# Patient Record
Sex: FEMALE | Race: WHITE | Employment: UNEMPLOYED | URBAN - METROPOLITAN AREA
[De-identification: names, ages, dates, MRNs, and addresses within clinical notes are randomized per-mention and may not be internally consistent; named-entity substitution may affect disease eponyms.]

---

## 2018-11-05 ENCOUNTER — APPOINTMENT (OUTPATIENT)
Dept: GENERAL RADIOLOGY | Age: 33
End: 2018-11-05
Payer: MEDICARE

## 2018-11-05 ENCOUNTER — HOSPITAL ENCOUNTER (EMERGENCY)
Age: 33
Discharge: HOME | End: 2018-11-06
Attending: EMERGENCY MEDICINE
Payer: MEDICARE

## 2018-11-05 VITALS
DIASTOLIC BLOOD PRESSURE: 81 MMHG | SYSTOLIC BLOOD PRESSURE: 125 MMHG | BODY MASS INDEX: 44.12 KG/M2 | HEART RATE: 101 BPM | TEMPERATURE: 99 F | WEIGHT: 249 LBS | OXYGEN SATURATION: 98 % | HEIGHT: 63 IN | RESPIRATION RATE: 24 BRPM

## 2018-11-05 DIAGNOSIS — S93.492A SPRAIN OF ANTERIOR TALOFIBULAR LIGAMENT OF LEFT ANKLE, INITIAL ENCOUNTER: Primary | ICD-10-CM

## 2018-11-05 PROCEDURE — 73610 X-RAY EXAM OF ANKLE: CPT

## 2018-11-05 PROCEDURE — 73630 X-RAY EXAM OF FOOT: CPT

## 2018-11-05 PROCEDURE — 6370000000 HC RX 637 (ALT 250 FOR IP): Performed by: STUDENT IN AN ORGANIZED HEALTH CARE EDUCATION/TRAINING PROGRAM

## 2018-11-05 PROCEDURE — 99283 EMERGENCY DEPT VISIT LOW MDM: CPT

## 2018-11-05 RX ORDER — IBUPROFEN 800 MG/1
800 TABLET ORAL EVERY 8 HOURS PRN
Qty: 21 TABLET | Refills: 0 | Status: SHIPPED | OUTPATIENT
Start: 2018-11-05 | End: 2018-11-12

## 2018-11-05 RX ORDER — IBUPROFEN 800 MG/1
800 TABLET ORAL ONCE
Status: COMPLETED | OUTPATIENT
Start: 2018-11-05 | End: 2018-11-05

## 2018-11-05 RX ORDER — CRUTCH
2 EACH MISCELLANEOUS DAILY PRN
Qty: 2 EACH | Refills: 0 | Status: SHIPPED | OUTPATIENT
Start: 2018-11-05 | End: 2018-11-05

## 2018-11-05 RX ADMIN — IBUPROFEN 800 MG: 800 TABLET, FILM COATED ORAL at 22:38

## 2018-11-05 ASSESSMENT — ENCOUNTER SYMPTOMS
HEARTBURN: 0
NAUSEA: 0
SPUTUM PRODUCTION: 0
COUGH: 0
VOMITING: 0
ABDOMINAL PAIN: 0
BLURRED VISION: 0
DOUBLE VISION: 0

## 2018-11-05 ASSESSMENT — PAIN SCALES - GENERAL
PAINLEVEL_OUTOF10: 9
PAINLEVEL_OUTOF10: 10

## 2018-11-05 ASSESSMENT — PAIN DESCRIPTION - ORIENTATION: ORIENTATION: LEFT

## 2018-11-05 ASSESSMENT — PAIN DESCRIPTION - PROGRESSION: CLINICAL_PROGRESSION: GRADUALLY WORSENING

## 2018-11-05 ASSESSMENT — PAIN DESCRIPTION - ONSET: ONSET: SUDDEN

## 2018-11-05 ASSESSMENT — PAIN DESCRIPTION - LOCATION: LOCATION: ANKLE

## 2018-11-05 ASSESSMENT — PAIN DESCRIPTION - DESCRIPTORS: DESCRIPTORS: ACHING;BURNING;SHOOTING

## 2018-11-05 ASSESSMENT — PAIN DESCRIPTION - PAIN TYPE: TYPE: ACUTE PAIN

## 2018-11-05 ASSESSMENT — PAIN DESCRIPTION - FREQUENCY: FREQUENCY: INTERMITTENT

## 2018-11-06 NOTE — ED PROVIDER NOTES
5  RADS NEG FOR FX OR OTHER ACUTE BONY ABN  IMP-SPRAIN LEFT ANKLE. PLAN-AIRCAST, CRUTCHES, ICEPACK, RX IBUPROFEN. F/U WITH ORTHO CLINIC-CALL IN AM. RETURN IF SX WORSEN OR PROGRESS.      Kathya Pineda MD  11/05/18 2680

## 2018-11-06 NOTE — H&P
[facility]  ED  eMERGENCY dEPARTMENT eNCOUnter  Resident    Pt Name: Orlin Ocampo  MRN: [id number]  [place] 1985  Date of evaluation: 2018  PCP:  Xavier Tam MD    [address] [place] [address]       Chief Complaint   Patient presents with    Ankle Pain     left ankle, twitsted ankle today around 4:30pm, pain increased when she got home and took shoe off         [address]    Orlin Ocampo is a 26 y.o. female who presents to ed with left ankle and foot pain. Per patient she was trying to fold metal door by jumping on her and her foot bowed and she landed on outer side of her left foot, did not have complete fall and was able to get up and walk around but by the time she got home it was hurting real bad and she got worried about fracture and came to ED. REVIEW OF SYSTEMS       Review of Systems   Constitutional: Negative for chills, fever and weight loss. HENT: Negative for hearing loss and tinnitus. Eyes: Negative for blurred vision and double vision. Respiratory: Negative for cough and sputum production. Cardiovascular: Negative for chest pain and palpitations. Gastrointestinal: Negative for abdominal pain, heartburn, nausea and vomiting. Genitourinary: Negative for dysuria and urgency. Musculoskeletal: Positive for falls and joint pain. Negative for myalgias. Skin: Negative for itching and rash. Neurological: Negative for dizziness, sensory change, focal weakness, loss of consciousness and headaches. Psychiatric/Behavioral: Negative. PAST MEDICAL HISTORY    has a past medical history of Asthma. SURGICAL HISTORY      has a past surgical history that includes Breast surgery; Cholecystectomy;  section; Dilation and curettage of uterus; and Umbilical hernia repair. CURRENT MEDICATIONS       Previous Medications    No medications on file       ALLERGIES     has No Known Allergies.     FAMILY HISTORY     has no family status information on

## 2018-11-06 NOTE — ED PROVIDER NOTES
or malalignment identified. The joint spaces are maintained. No acute soft tissue abnormality identified. Small plantar calcaneal spur. No acute osseous abnormality identified. RECENT VITALS:     Temp: 99 °F (37.2 °C),  Pulse: 101, Resp: 24, BP: 125/81, SpO2: 98 %    This patient is a 26 y.o. Female with ankle pain. Imaging studies are negative for acute osseous injury. Patient provided with aircast and crutches. Patient advised to follow up with PCP. OUTSTANDING TASKS / RECOMMENDATIONS:    1. Awaiting discharge paperwork     FINAL IMPRESSION:     1.  Sprain of anterior talofibular ligament of left ankle, initial encounter        DISPOSITION:         DISPOSITION:  [x]  Discharge   []  Transfer -    []  Admission -     []  Against Medical Advice   []  Eloped   FOLLOW-UP: [facility]  [address] [place] [address]  [phone]    If symptoms worsen    Wilber Riojas MD  [address] [address]  [phone]    Schedule an appointment as soon as possible for a visit   to establish PCP    [facility] [address], [address]  [phone]  Schedule an appointment as soon as possible for a visit in 1 week       DISCHARGE MEDICATIONS: New Prescriptions    IBUPROFEN (ADVIL;MOTRIN) 800 MG TABLET    Take 1 tablet by mouth every 8 hours as needed for Pain           Johnna Marmolejo MD  Emergency Medicine Resident  [address] Johnna Marmolejo MD  Resident  11/06/18 6457

## 2024-03-06 ENCOUNTER — HOSPITAL ENCOUNTER (EMERGENCY)
Age: 39
Discharge: HOME | End: 2024-03-06
Attending: EMERGENCY MEDICINE
Payer: COMMERCIAL

## 2024-03-06 ENCOUNTER — APPOINTMENT (OUTPATIENT)
Dept: CT IMAGING | Age: 39
End: 2024-03-06
Payer: COMMERCIAL

## 2024-03-06 ENCOUNTER — APPOINTMENT (OUTPATIENT)
Dept: ULTRASOUND IMAGING | Age: 39
End: 2024-03-06
Payer: COMMERCIAL

## 2024-03-06 VITALS
OXYGEN SATURATION: 96 % | RESPIRATION RATE: 18 BRPM | TEMPERATURE: 98 F | DIASTOLIC BLOOD PRESSURE: 81 MMHG | SYSTOLIC BLOOD PRESSURE: 130 MMHG | BODY MASS INDEX: 40.74 KG/M2 | WEIGHT: 230 LBS | HEART RATE: 74 BPM

## 2024-03-06 DIAGNOSIS — K62.5 RECTAL BLEEDING: ICD-10-CM

## 2024-03-06 DIAGNOSIS — K64.4 EXTERNAL HEMORRHOID: Primary | ICD-10-CM

## 2024-03-06 DIAGNOSIS — N61.1 ABSCESS OF RIGHT BREAST: ICD-10-CM

## 2024-03-06 LAB
ALBUMIN SERPL-MCNC: 3.8 G/DL (ref 3.5–5.2)
ALBUMIN/GLOB SERPL: 2 {RATIO} (ref 1–2.5)
ALT SERPL-CCNC: 19 U/L (ref 10–35)
ANION GAP SERPL CALCULATED.3IONS-SCNC: 11 MMOL/L (ref 9–16)
AST SERPL-CCNC: 18 U/L (ref 10–35)
BASOPHILS # BLD: 0.07 K/UL (ref 0–0.2)
BASOPHILS NFR BLD: 1 % (ref 0–2)
BILIRUB SERPL-MCNC: <0.2 MG/DL (ref 0–1.2)
BUN SERPL-MCNC: 10 MG/DL (ref 6–20)
CALCIUM SERPL-MCNC: 8.8 MG/DL (ref 8.6–10.4)
CHLORIDE SERPL-SCNC: 105 MMOL/L (ref 98–107)
CREAT SERPL-MCNC: 0.6 MG/DL (ref 0.5–0.9)
EOSINOPHIL # BLD: 0.26 K/UL (ref 0–0.44)
EOSINOPHILS RELATIVE PERCENT: 2 % (ref 1–4)
ERYTHROCYTE [DISTWIDTH] IN BLOOD BY AUTOMATED COUNT: 13.4 % (ref 11.8–14.4)
GLUCOSE SERPL-MCNC: 113 MG/DL (ref 74–99)
HCG SERPL QL: NEGATIVE
HCT VFR BLD AUTO: 42.5 % (ref 36.3–47.1)
HGB BLD-MCNC: 14.1 G/DL (ref 11.9–15.1)
IMM GRANULOCYTES # BLD AUTO: 0.05 K/UL (ref 0–0.3)
IMM GRANULOCYTES NFR BLD: 0 %
LIPASE SERPL-CCNC: 42 U/L (ref 13–60)
LYMPHOCYTES NFR BLD: 3.74 K/UL (ref 1.1–3.7)
LYMPHOCYTES RELATIVE PERCENT: 25 % (ref 24–43)
MCH RBC QN AUTO: 31.5 PG (ref 25.2–33.5)
MCHC RBC AUTO-ENTMCNC: 33.2 G/DL (ref 28.4–34.8)
MCV RBC AUTO: 94.9 FL (ref 82.6–102.9)
MONOCYTES NFR BLD: 1.03 K/UL (ref 0.1–1.2)
MONOCYTES NFR BLD: 7 % (ref 3–12)
NEUTROPHILS NFR BLD: 65 % (ref 36–65)
NEUTS SEG NFR BLD: 9.78 K/UL (ref 1.5–8.1)
NRBC BLD-RTO: 0 PER 100 WBC
PLATELET # BLD AUTO: 209 K/UL (ref 138–453)
PMV BLD AUTO: 12 FL (ref 8.1–13.5)
PROT SERPL-MCNC: 6.2 G/DL (ref 6.6–8.7)
RBC # BLD AUTO: 4.48 M/UL (ref 3.95–5.11)
SODIUM SERPL-SCNC: 140 MMOL/L (ref 136–145)
WBC OTHER # BLD: 14.9 K/UL (ref 3.5–11.3)

## 2024-03-06 PROCEDURE — 76642 ULTRASOUND BREAST LIMITED: CPT

## 2024-03-06 PROCEDURE — 6360000002 HC RX W HCPCS: Performed by: STUDENT IN AN ORGANIZED HEALTH CARE EDUCATION/TRAINING PROGRAM

## 2024-03-06 PROCEDURE — 96361 HYDRATE IV INFUSION ADD-ON: CPT | Performed by: EMERGENCY MEDICINE

## 2024-03-06 PROCEDURE — 2500000003 HC RX 250 WO HCPCS

## 2024-03-06 PROCEDURE — 2500000003 HC RX 250 WO HCPCS: Performed by: STUDENT IN AN ORGANIZED HEALTH CARE EDUCATION/TRAINING PROGRAM

## 2024-03-06 PROCEDURE — 83690 ASSAY OF LIPASE: CPT

## 2024-03-06 PROCEDURE — 99285 EMERGENCY DEPT VISIT HI MDM: CPT | Performed by: EMERGENCY MEDICINE

## 2024-03-06 PROCEDURE — 6360000004 HC RX CONTRAST MEDICATION

## 2024-03-06 PROCEDURE — 80053 COMPREHEN METABOLIC PANEL: CPT

## 2024-03-06 PROCEDURE — 85025 COMPLETE CBC W/AUTO DIFF WBC: CPT

## 2024-03-06 PROCEDURE — 96365 THER/PROPH/DIAG IV INF INIT: CPT | Performed by: EMERGENCY MEDICINE

## 2024-03-06 PROCEDURE — 96375 TX/PRO/DX INJ NEW DRUG ADDON: CPT | Performed by: EMERGENCY MEDICINE

## 2024-03-06 PROCEDURE — 6360000002 HC RX W HCPCS: Performed by: EMERGENCY MEDICINE

## 2024-03-06 PROCEDURE — 74177 CT ABD & PELVIS W/CONTRAST: CPT

## 2024-03-06 PROCEDURE — 6360000002 HC RX W HCPCS

## 2024-03-06 PROCEDURE — 84703 CHORIONIC GONADOTROPIN ASSAY: CPT

## 2024-03-06 PROCEDURE — 2580000003 HC RX 258: Performed by: EMERGENCY MEDICINE

## 2024-03-06 PROCEDURE — 2580000003 HC RX 258

## 2024-03-06 RX ORDER — KETOROLAC TROMETHAMINE 30 MG/ML
30 INJECTION, SOLUTION INTRAMUSCULAR; INTRAVENOUS ONCE
Status: COMPLETED | OUTPATIENT
Start: 2024-03-06 | End: 2024-03-06

## 2024-03-06 RX ORDER — 0.9 % SODIUM CHLORIDE 0.9 %
1000 INTRAVENOUS SOLUTION INTRAVENOUS ONCE
Status: COMPLETED | OUTPATIENT
Start: 2024-03-06 | End: 2024-03-06

## 2024-03-06 RX ORDER — FAMOTIDINE 10 MG/ML
20 INJECTION, SOLUTION INTRAVENOUS
Status: COMPLETED | OUTPATIENT
Start: 2024-03-06 | End: 2024-03-06

## 2024-03-06 RX ORDER — HYDROCORTISONE 25 MG/G
CREAM TOPICAL
Qty: 28 G | Refills: 0 | Status: SHIPPED | OUTPATIENT
Start: 2024-03-06

## 2024-03-06 RX ORDER — ONDANSETRON 2 MG/ML
4 INJECTION INTRAMUSCULAR; INTRAVENOUS ONCE
Status: COMPLETED | OUTPATIENT
Start: 2024-03-06 | End: 2024-03-06

## 2024-03-06 RX ORDER — CEPHALEXIN 500 MG/1
500 CAPSULE ORAL 4 TIMES DAILY
Qty: 40 CAPSULE | Refills: 0 | Status: SHIPPED | OUTPATIENT
Start: 2024-03-06 | End: 2024-03-16

## 2024-03-06 RX ORDER — FENTANYL CITRATE 50 UG/ML
50 INJECTION, SOLUTION INTRAMUSCULAR; INTRAVENOUS ONCE
Status: COMPLETED | OUTPATIENT
Start: 2024-03-06 | End: 2024-03-06

## 2024-03-06 RX ORDER — DOCUSATE SODIUM 100 MG/1
100 CAPSULE, LIQUID FILLED ORAL 2 TIMES DAILY
Qty: 10 CAPSULE | Refills: 0 | Status: SHIPPED | OUTPATIENT
Start: 2024-03-06 | End: 2024-03-11

## 2024-03-06 RX ORDER — LIDOCAINE 5 G/100G
1 CREAM RECTAL; TOPICAL EVERY 6 HOURS PRN
Qty: 28 G | Refills: 0 | Status: SHIPPED | OUTPATIENT
Start: 2024-03-06 | End: 2024-03-16

## 2024-03-06 RX ORDER — LIDOCAINE HYDROCHLORIDE 10 MG/ML
5 INJECTION, SOLUTION INFILTRATION; PERINEURAL ONCE
Status: COMPLETED | OUTPATIENT
Start: 2024-03-06 | End: 2024-03-06

## 2024-03-06 RX ADMIN — KETOROLAC TROMETHAMINE 30 MG: 30 INJECTION, SOLUTION INTRAMUSCULAR; INTRAVENOUS at 06:38

## 2024-03-06 RX ADMIN — CEFAZOLIN 1000 MG: 1 INJECTION, POWDER, FOR SOLUTION INTRAMUSCULAR; INTRAVENOUS at 10:24

## 2024-03-06 RX ADMIN — LIDOCAINE HYDROCHLORIDE 5 ML: 10 INJECTION, SOLUTION INFILTRATION; PERINEURAL at 09:41

## 2024-03-06 RX ADMIN — IOPAMIDOL 75 ML: 755 INJECTION, SOLUTION INTRAVENOUS at 05:05

## 2024-03-06 RX ADMIN — ONDANSETRON 4 MG: 2 INJECTION INTRAMUSCULAR; INTRAVENOUS at 04:22

## 2024-03-06 RX ADMIN — FENTANYL CITRATE 50 MCG: 50 INJECTION INTRAMUSCULAR; INTRAVENOUS at 11:09

## 2024-03-06 RX ADMIN — SODIUM CHLORIDE 1000 ML: 9 INJECTION, SOLUTION INTRAVENOUS at 04:18

## 2024-03-06 RX ADMIN — FAMOTIDINE 20 MG: 10 INJECTION, SOLUTION INTRAVENOUS at 04:22

## 2024-03-06 ASSESSMENT — ENCOUNTER SYMPTOMS
NAUSEA: 0
SHORTNESS OF BREATH: 0
COUGH: 0
VOMITING: 0
SORE THROAT: 0
ABDOMINAL PAIN: 1
ABDOMINAL PAIN: 0
DIARRHEA: 0
RECTAL PAIN: 1
BACK PAIN: 0

## 2024-03-06 ASSESSMENT — PAIN DESCRIPTION - ORIENTATION: ORIENTATION: MID

## 2024-03-06 ASSESSMENT — PAIN - FUNCTIONAL ASSESSMENT: PAIN_FUNCTIONAL_ASSESSMENT: 0-10

## 2024-03-06 ASSESSMENT — PAIN SCALES - GENERAL
PAINLEVEL_OUTOF10: 6
PAINLEVEL_OUTOF10: 10
PAINLEVEL_OUTOF10: 9

## 2024-03-06 ASSESSMENT — PAIN DESCRIPTION - LOCATION
LOCATION: ABDOMEN
LOCATION: ABDOMEN;BREAST

## 2024-03-06 ASSESSMENT — PAIN DESCRIPTION - DESCRIPTORS: DESCRIPTORS: DULL

## 2024-03-06 NOTE — ED PROVIDER NOTES
[facility]     Emergency Department     Faculty Attestation    I performed a history and physical examination of the patient and discussed management with the resident. I have reviewed and agree with the resident’s findings including all diagnostic interpretations, and treatment plans as written. Any areas of disagreement are noted on the chart. I was personally present for the key portions of any procedures. I have documented in the chart those procedures where I was not present during the key portions. I have reviewed the emergency nurses triage note. I agree with the chief complaint, past medical history, past surgical history, allergies, medications, social and family history as documented unless otherwise noted below. Documentation of the HPI, Physical Exam and Medical Decision Making performed by misael is based on my personal performance of the HPI, PE and MDM. For Physician Assistant/ Nurse Practitioner cases/documentation I have personally evaluated this patient and have completed at least one if not all key elements of the E/M (history, physical exam, and MDM). Additional findings are as noted.    Note Started: 3:51 AM EST     37 yo F c/o rectal bleed, constipation, no vomit + nausea, no fever,   PE Cassandra RN  escort for exam: hypertensive, gcs 15 mild periumbilical tenderness, no rebound, no guarding, no rigidity, no mass,  External rectal exam external hemorrhoid on the left, nonthrombosed, no active rectal bleeding,    Ct abdomen -, hi wbc, on further hx, pt complains of right breast swelling and erythema, with drainage, Cassandra RN escort for exam, 2 to 3:00 o'clock position right breast abscess, surrounding erythema    General surgery consulted & request breast US  > care turned over to day shift    EKG Interpretation    Interpreted by me      CRITICAL CARE: There was a high probability of clinically significant/life threatening deterioration in 
     FACULTY SIGN-OUT  ADDENDUM       Patient: Brittney Norton   MRN: [id number]  PCP:  No primary care provider on file.  Note Started: 3/6/24, 7:11 AM EST  Attestation  I was available and discussed any additional care issues that arose and coordinated the management plans with the resident(s) caring for the patient during my duty period. Any areas of disagreement with resident's documentation of care or procedures are noted on the chart. I was personally present for the key portions of any/all procedures during my duty period. I have documented in the chart those procedures where I was not present during the key portions.   The patient's initial evaluation and plan have been discussed with the prior provider who initially evaluated the patient.      Pertinent Comments:  The patient is a 38 y.o. female taken in signout with initial rectal bleeding found to have hemorrhoid with abdominal workup negative including CT however slightly elevated white blood cell count of 14.    Then stated has several breast abscesses.  She has another one beginning on the right breast and had drainage yesterday but none today.   By previous attending there is erythema and concern for possible abscess/cellulitis.   General surgery has been consulted and wish ultrasound  We are awaiting general surgery eventual recommendation or procedure and ultrasound.      General surgery is at the bedside and performed needle aspiration.   Ancef has been given here initially IV and will transition to Keflex for outpatient treatment.   They have arranged outpatient follow-up as well      ED COURSE      The patient was given the following medications:  Orders Placed This Encounter   Medications    famotidine (PEPCID) injection 20 mg    ondansetron (ZOFRAN) injection 4 mg    sodium chloride 0.9 % bolus 1,000 mL    iopamidol (ISOVUE-370) 76 % injection 75 mL    hydrocortisone (ANUSOL-HC) 2.5 % CREA rectal cream     Sig: Apply to anal area, as needed, up to 
HYDROCORTISONE (ANUSOL-HC) 2.5 % CREA RECTAL CREAM    Apply to anal area, as needed, up to 6 times daily or after each bowel movement.    LIDOCAINE, ANORECTAL, 5 % CREA    Apply 1 application  topically every 6 hours as needed (For rectal discomfort and after bowel movements)    MISC. DEVICES (SITZ BATH) MISC    Utilize sitz bath for symptomatic treatment of hemorrhoid.       Grayson Smith DO  Emergency Medicine Resident    (Please note that portions of thisnote were completed with a voice recognition program.  Efforts were made to edit the dictations but occasionally words are mis-transcribed.)

## 2024-03-06 NOTE — PROCEDURES
OPERATIVE NOTE    DATE OF PROCEDURE: 3/6/2024     SURGEON: Dr. Mendoza     ASSISTANT: Cassandra Schmitt DO    PREOPERATIVE DIAGNOSIS: Abscess right breast     POSTOPERATIVE DIAGNOSIS: Same    PROCEDURE: Aspiration of right breast abscess     ANESTHESIA: Local    ESTIMATED BLOOD LOSS:  minimal     HISTORY:    The patient is a 38 y.o. year old female with history of above preop diagnosis. She underwent breast ultrasound that showed a 3 cm fluid collection at the area of concern. Decision was made to proceed with ultrasound guided needle aspiration. Risks and benefits discussed, and consent obtained.     PROCEDURE:    The patient was positioned in a supine position. The area of fluctuance one there right breast at the twelve o'clock position was palpated and prepped with betadine solution. The area was then anesthestized with 1% Lidocaine without epinephrine. Under ultrasound guidance, an 18 gauge needle was used to aspirate the area. This yielded 2.5 cc of purulent fluid. Improvement noted on ultrasound. A dry dressing was then applied. The patient tolerated the procedure well without apparent complication.     Electronically signed by Cassandra Schmitt DO on 3/6/2024 at 11:29 AM          Trauma Attending Attestation      I have reviewed the above GCS note(s) and confirmed the key elements of the medical history and physical exam. I have seen and examined the pt.  I have discussed the findings, established the care plan and recommendations with Resident.      Juan Mendoza DO  3/9/2024  7:09 PM

## 2024-03-06 NOTE — CONSULTS
General Surgery  Consult    PATIENT NAME: Brittney Norton  AGE: 38 y.o.  MEDICAL RECORD NO. [id number]  DATE: 3/6/2024  SURGEON: Dr. Mendoza  PRIMARY CARE PHYSICIAN: No primary care provider on file.    Patient evaluated at the request of  Dr. Yanez   Reason for evaluation: Right breast abscess     Patient information was obtained from patient.  History/Exam limitations: none.    IMPRESSION:   38 year old female with a right breast abscess       PLAN:   Obtain formal breast ultrasound  Will plan for IV antibiotics dose x 1 and discharge on PO  Will perform bedside aspiration; see separate procedure note   Plan to have patient follow up outpatient with breast surgery       HISTORY:   History of Chief Complaint:    Brittney Norton is a 38 y.o. female who presents with rectal bleeding. ER evaluated patient and found a hemorrhoid. Patient noted to have elevated WBC and did report to ED that she has a draining area on her right breast, for which general surgery was consulted. Patient states she has had chronic bilateral breast abscesses and has undergone previous surgical excision on the left breast as well as multiple incision and drainage procedures of both breasts. She states these have been performed at [facility]. She states this area on the right breast has been present for a few days. She states it has drained some purulent fluid. She states the area is tender. She denies any fevers or chills.     Past Medical History   has a past medical history of Asthma.  Past Surgical History   has a past surgical history that includes Breast surgery; Cholecystectomy;  section; Dilation and curettage of uterus; and Umbilical hernia repair.  Medications  Prior to Admission medications    Medication Sig Start Date End Date Taking? Authorizing Provider   hydrocortisone (ANUSOL-HC) 2.5 % CREA rectal cream Apply to anal area, as needed, up to 6 times daily or after each bowel movement. 3/6/24  Yes Grayson Smith

## 2024-03-06 NOTE — ED NOTES
Pt now complaining of a R breast abscess.   Pt has hx of recurring abscess and has needed them drained in the past.   Dr. Smith at bedside. Pt has an area of induration, redness and tenderness to the R breast, superior to the areola.

## 2024-03-06 NOTE — ED NOTES
Patient presents to the ED via EMS with c/o rectal bleeding. Patient states she has been constipated for the past 3 days before bowel movement today. Pt has used multiple stool softeners. Pt denies excessive straining. After bowel movement today, pt states she noticed dark red blood in the toilet bowl.   Pt is also complaining of lower abd cramping, nausea and a feeling of indigestion. Pt denies any chest pain or shortness of breath. Pt has hx of cholecystectomy and notes chronic loose stools.   Pt appears comfortable, no distress noted, RR even and unlabored. Pt has mild pain to palpation to her mid abd. Pt does have an external hemorrhoid.   IV placed by EMS, labs collected.  Call light within reach. Pt's son at bedside.

## 2024-03-06 NOTE — DISCHARGE INSTRUCTIONS
You have been evaluated in the Emergency Department at [facility] 3/6/2024     Your recommendations and if necessary prescriptions from today's visit:   -Take medication as prescribed  -Follow-up with your PCP    Should you have any questions regarding your care or further treatment, please call [facility] Emergency Department at [phone].    PLEASE RETURN TO THE EMERGENCY DEPARTMENT IMMEDIATELY for   -Fevers, chills, night sweats  -Chest pain, shortness of breath  -Changing symptoms  -Worsening symptoms  -Unable to follow up with your primary care provider  -Any other care or concern

## 2024-03-09 PROBLEM — K64.4 EXTERNAL HEMORRHOID: Status: ACTIVE | Noted: 2024-03-09

## 2025-03-06 ENCOUNTER — HOSPITAL ENCOUNTER (EMERGENCY)
Age: 40
Discharge: HOME | End: 2025-03-06
Attending: EMERGENCY MEDICINE
Payer: COMMERCIAL

## 2025-03-06 ENCOUNTER — APPOINTMENT (OUTPATIENT)
Dept: GENERAL RADIOLOGY | Age: 40
End: 2025-03-06
Payer: COMMERCIAL

## 2025-03-06 ENCOUNTER — APPOINTMENT (OUTPATIENT)
Dept: CT IMAGING | Age: 40
End: 2025-03-06
Payer: COMMERCIAL

## 2025-03-06 VITALS
TEMPERATURE: 98.5 F | HEART RATE: 100 BPM | OXYGEN SATURATION: 92 % | DIASTOLIC BLOOD PRESSURE: 84 MMHG | RESPIRATION RATE: 22 BRPM | SYSTOLIC BLOOD PRESSURE: 100 MMHG

## 2025-03-06 DIAGNOSIS — F14.10 COCAINE ABUSE (HCC): ICD-10-CM

## 2025-03-06 DIAGNOSIS — R41.82 ALTERED MENTAL STATUS, UNSPECIFIED ALTERED MENTAL STATUS TYPE: Primary | ICD-10-CM

## 2025-03-06 DIAGNOSIS — T18.9XXA INGESTION OF FOREIGN MATERIAL, INITIAL ENCOUNTER: ICD-10-CM

## 2025-03-06 DIAGNOSIS — R94.31 PROLONGED QT INTERVAL: ICD-10-CM

## 2025-03-06 LAB
ANION GAP SERPL CALCULATED.3IONS-SCNC: 18 MMOL/L (ref 9–16)
BASOPHILS # BLD: 0 K/UL (ref 0–0.2)
BASOPHILS NFR BLD: 0 % (ref 0–2)
BUN SERPL-MCNC: 9 MG/DL (ref 6–20)
CALCIUM SERPL-MCNC: 9.1 MG/DL (ref 8.6–10.4)
CHLORIDE SERPL-SCNC: 104 MMOL/L (ref 98–107)
CO2 SERPL-SCNC: 20 MMOL/L (ref 20–31)
CREAT SERPL-MCNC: 1 MG/DL (ref 0.6–0.9)
EOSINOPHIL # BLD: 0.19 K/UL (ref 0–0.4)
EOSINOPHILS RELATIVE PERCENT: 1 % (ref 1–4)
ERYTHROCYTE [DISTWIDTH] IN BLOOD BY AUTOMATED COUNT: 12.7 % (ref 11.8–14.4)
GFR, ESTIMATED: 73 ML/MIN/1.73M2
GLUCOSE SERPL-MCNC: 143 MG/DL (ref 74–99)
HCG SERPL QL: NEGATIVE
HCT VFR BLD AUTO: 46.3 % (ref 36.3–47.1)
HGB BLD-MCNC: 15.5 G/DL (ref 11.9–15.1)
IMM GRANULOCYTES # BLD AUTO: 0 K/UL (ref 0–0.3)
IMM GRANULOCYTES NFR BLD: 0 %
LYMPHOCYTES NFR BLD: 4.83 K/UL (ref 1–4.8)
LYMPHOCYTES RELATIVE PERCENT: 25 % (ref 24–44)
MCH RBC QN AUTO: 30.2 PG (ref 25.2–33.5)
MCHC RBC AUTO-ENTMCNC: 33.5 G/DL (ref 28.4–34.8)
MCV RBC AUTO: 90.1 FL (ref 82.6–102.9)
MONOCYTES NFR BLD: 1.54 K/UL (ref 0.1–0.8)
MONOCYTES NFR BLD: 8 % (ref 1–7)
MORPHOLOGY: NORMAL
NEUTROPHILS NFR BLD: 66 % (ref 36–66)
NEUTS SEG NFR BLD: 12.74 K/UL (ref 1.8–7.7)
NRBC BLD-RTO: 0 PER 100 WBC
PLATELET # BLD AUTO: 301 K/UL (ref 138–453)
PMV BLD AUTO: 10.5 FL (ref 8.1–13.5)
POTASSIUM SERPL-SCNC: 3.3 MMOL/L (ref 3.7–5.3)
RBC # BLD AUTO: 5.14 M/UL (ref 3.95–5.11)
SODIUM SERPL-SCNC: 142 MMOL/L (ref 136–145)
TROPONIN I SERPL HS-MCNC: 15 NG/L (ref 0–14)
TROPONIN I SERPL HS-MCNC: 16 NG/L (ref 0–14)
WBC OTHER # BLD: 19.3 K/UL (ref 3.5–11.3)

## 2025-03-06 PROCEDURE — 80048 BASIC METABOLIC PNL TOTAL CA: CPT

## 2025-03-06 PROCEDURE — 96374 THER/PROPH/DIAG INJ IV PUSH: CPT | Performed by: EMERGENCY MEDICINE

## 2025-03-06 PROCEDURE — 71045 X-RAY EXAM CHEST 1 VIEW: CPT

## 2025-03-06 PROCEDURE — 70450 CT HEAD/BRAIN W/O DYE: CPT

## 2025-03-06 PROCEDURE — 96372 THER/PROPH/DIAG INJ SC/IM: CPT | Performed by: EMERGENCY MEDICINE

## 2025-03-06 PROCEDURE — 6360000002 HC RX W HCPCS

## 2025-03-06 PROCEDURE — 99285 EMERGENCY DEPT VISIT HI MDM: CPT | Performed by: EMERGENCY MEDICINE

## 2025-03-06 PROCEDURE — 96361 HYDRATE IV INFUSION ADD-ON: CPT | Performed by: EMERGENCY MEDICINE

## 2025-03-06 PROCEDURE — 93005 ELECTROCARDIOGRAM TRACING: CPT | Performed by: EMERGENCY MEDICINE

## 2025-03-06 PROCEDURE — 96365 THER/PROPH/DIAG IV INF INIT: CPT | Performed by: EMERGENCY MEDICINE

## 2025-03-06 PROCEDURE — 84703 CHORIONIC GONADOTROPIN ASSAY: CPT

## 2025-03-06 PROCEDURE — 85025 COMPLETE CBC W/AUTO DIFF WBC: CPT

## 2025-03-06 PROCEDURE — 74018 RADEX ABDOMEN 1 VIEW: CPT

## 2025-03-06 PROCEDURE — 96376 TX/PRO/DX INJ SAME DRUG ADON: CPT | Performed by: EMERGENCY MEDICINE

## 2025-03-06 PROCEDURE — 93005 ELECTROCARDIOGRAM TRACING: CPT

## 2025-03-06 PROCEDURE — 84484 ASSAY OF TROPONIN QUANT: CPT

## 2025-03-06 PROCEDURE — 72125 CT NECK SPINE W/O DYE: CPT

## 2025-03-06 PROCEDURE — 96375 TX/PRO/DX INJ NEW DRUG ADDON: CPT | Performed by: EMERGENCY MEDICINE

## 2025-03-06 PROCEDURE — 2580000003 HC RX 258

## 2025-03-06 RX ORDER — MIDAZOLAM HYDROCHLORIDE 1 MG/ML
INJECTION, SOLUTION INTRAMUSCULAR; INTRAVENOUS
Status: COMPLETED
Start: 2025-03-06 | End: 2025-03-06

## 2025-03-06 RX ORDER — HALOPERIDOL 5 MG/ML
5 INJECTION INTRAMUSCULAR ONCE
Status: COMPLETED | OUTPATIENT
Start: 2025-03-06 | End: 2025-03-06

## 2025-03-06 RX ORDER — 0.9 % SODIUM CHLORIDE 0.9 %
1000 INTRAVENOUS SOLUTION INTRAVENOUS ONCE
Status: COMPLETED | OUTPATIENT
Start: 2025-03-06 | End: 2025-03-06

## 2025-03-06 RX ORDER — MIDAZOLAM HYDROCHLORIDE 2 MG/2ML
1 INJECTION, SOLUTION INTRAMUSCULAR; INTRAVENOUS ONCE
Status: COMPLETED | OUTPATIENT
Start: 2025-03-06 | End: 2025-03-06

## 2025-03-06 RX ORDER — DIPHENHYDRAMINE HYDROCHLORIDE 50 MG/ML
50 INJECTION INTRAMUSCULAR; INTRAVENOUS ONCE
Status: COMPLETED | OUTPATIENT
Start: 2025-03-06 | End: 2025-03-06

## 2025-03-06 RX ORDER — MIDAZOLAM HYDROCHLORIDE 2 MG/2ML
2 INJECTION, SOLUTION INTRAMUSCULAR; INTRAVENOUS ONCE
Status: COMPLETED | OUTPATIENT
Start: 2025-03-06 | End: 2025-03-06

## 2025-03-06 RX ORDER — MAGNESIUM SULFATE IN WATER 40 MG/ML
2000 INJECTION, SOLUTION INTRAVENOUS ONCE
Status: COMPLETED | OUTPATIENT
Start: 2025-03-06 | End: 2025-03-06

## 2025-03-06 RX ORDER — LORAZEPAM 2 MG/ML
2 INJECTION INTRAMUSCULAR ONCE
Status: COMPLETED | OUTPATIENT
Start: 2025-03-06 | End: 2025-03-06

## 2025-03-06 RX ADMIN — HALOPERIDOL LACTATE 5 MG: 5 INJECTION, SOLUTION INTRAMUSCULAR at 04:24

## 2025-03-06 RX ADMIN — Medication 2 MG: at 04:24

## 2025-03-06 RX ADMIN — MAGNESIUM SULFATE HEPTAHYDRATE 2000 MG: 40 INJECTION, SOLUTION INTRAVENOUS at 06:00

## 2025-03-06 RX ADMIN — SODIUM CHLORIDE 1000 ML: 0.9 INJECTION, SOLUTION INTRAVENOUS at 05:06

## 2025-03-06 RX ADMIN — DIPHENHYDRAMINE HYDROCHLORIDE 50 MG: 50 INJECTION, SOLUTION INTRAMUSCULAR; INTRAVENOUS at 04:24

## 2025-03-06 RX ADMIN — MIDAZOLAM 1 MG: 1 INJECTION INTRAMUSCULAR; INTRAVENOUS at 05:10

## 2025-03-06 RX ADMIN — MIDAZOLAM HYDROCHLORIDE 1 MG: 2 INJECTION, SOLUTION INTRAMUSCULAR; INTRAVENOUS at 05:10

## 2025-03-06 RX ADMIN — MIDAZOLAM HYDROCHLORIDE 2 MG: 1 INJECTION, SOLUTION INTRAMUSCULAR; INTRAVENOUS at 04:39

## 2025-03-06 NOTE — DISCHARGE INSTRUCTIONS
You were brought into the emergency department for altered mental status and agitation.    Your heart rate was initially elevated on arrival.  This improved with medication for agitation and IV fluids.    Lab work did show a mild elevation in your kidney numbers as well as your white blood cell count.  This is likely from dehydration and should have improved with IV fluids.    Chest area as well as x-ray of your abdomen did not show any obstructive process since you ingested packets of cocaine.  Spoke with poison control and you have been here for the entire observation period necessary after ingestion.    Social work did meet with you when you were more awake and alert and you did not express any suicidal or homicidal ideation.    You were able to ambulate and eat without any difficulty.    You are stable for discharge at this time.  Social work did provide you with resources to help with your cocaine abuse.    Please follow-up with a primary care provider.  Please call one of the family doctors provided and schedule appointment to establish care.    Please return to the emergency department if you develop any abdominal pain, nausea, vomiting, or any other concerning symptoms.

## 2025-03-06 NOTE — ED PROVIDER NOTES
[facility] EMERGENCY DEPARTMENT  Emergency Department  Emergency Medicine Resident Turn-Over     Note Started: 6:03 AM EST    Care of Brittney Norton was assumed from Dr. Roach and is being seen for Altered Mental Status and Drug Overdose  .  The patient's initial evaluation and plan have been discussed with the prior provider who initially evaluated the patient.     EMERGENCY DEPARTMENT COURSE / MEDICAL DECISION MAKING:       MEDICATIONS GIVEN:  Orders Placed This Encounter   Medications    midazolam PF (VERSED) injection 2 mg    haloperidol lactate (HALDOL) injection 5 mg    LORazepam (ATIVAN) injection 2 mg    diphenhydrAMINE (BENADRYL) injection 50 mg    sodium chloride 0.9 % bolus 1,000 mL    midazolam (VERSED) 2 MG/2ML injection     Pako Truong: cabinet override    midazolam PF (VERSED) injection 1 mg    magnesium sulfate 2000 mg in 50 mL IVPB premix       LABS / RADIOLOGY:     Labs Reviewed   CBC WITH AUTO DIFFERENTIAL - Abnormal; Notable for the following components:       Result Value    WBC 19.3 (*)     RBC 5.14 (*)     Hemoglobin 15.5 (*)     Monocytes % 8 (*)     Neutrophils Absolute 12.74 (*)     Lymphocytes Absolute 4.83 (*)     Monocytes Absolute 1.54 (*)     All other components within normal limits   BASIC METABOLIC PANEL - Abnormal; Notable for the following components:    Potassium 3.3 (*)     Anion Gap 18 (*)     Glucose 143 (*)     Creatinine 1.0 (*)     All other components within normal limits   TROPONIN - Abnormal; Notable for the following components:    Troponin, High Sensitivity 15 (*)     All other components within normal limits   TROPONIN - Abnormal; Notable for the following components:    Troponin, High Sensitivity 16 (*)     All other components within normal limits   HCG, SERUM, QUALITATIVE       XR CHEST 1 VIEW    Result Date: 2/4/2025  XR CHEST 1 VIEW  2/4/2025 5:52 PM CLINICAL INDICATIONS: Cough COMPARISON: None FINDINGS: Single view of the chest was obtained.

## 2025-03-06 NOTE — ED PROVIDER NOTES
[facility]     Emergency Department     Faculty Attestation    I performed a history and physical examination of the patient and discussed management with the resident. I have reviewed and agree with the resident’s findings including all diagnostic interpretations, and treatment plans as written. Any areas of disagreement are noted on the chart. I was personally present for the key portions of any procedures. I have documented in the chart those procedures where I was not present during the key portions. I have reviewed the emergency nurses triage note. I agree with the chief complaint, past medical history, past surgical history, allergies, medications, social and family history as documented unless otherwise noted below. Documentation of the HPI, Physical Exam and Medical Decision Making performed by [email or web address] is based on my personal performance of the HPI, PE and MDM. For Physician Assistant/ Nurse Practitioner cases/documentation I have personally evaluated this patient and have completed at least one if not all key elements of the E/M (history, physical exam, and MDM). Additional findings are as noted.    Note Started: 4:37 AM EST     40 yo F agitation, using crack for 3 days, pt denies injury,   PE patient alert, agitated, HOLA, no cervical tenderness, crepitus, or deformity,  -- Sedation, lab, CT, and reevaluation // needing recheck, day shift sign out    EKG Interpretation    Interpreted by me  Sinus tachycardia, heart rate 157, no ischemia, normal axis, QTc 497    CRITICAL CARE: There was a high probability of clinically significant/life threatening deterioration in this patient's condition which required my urgent intervention.  Total critical care time was 0 minutes.  This excludes any time for separately reportable procedures.       Mert Brice DO  03/06/25 0438       Mert Yanez DO  03/06/25 0456       Beau

## 2025-03-06 NOTE — ED NOTES
Pt arriving to ed 14 via ems and tpd   Reported to have been smoking crack for the past 3 days, upon arrival pt thn swallowed an aditional amount of crack. Pt is erratic, tearful and yelling on arrival. Pt not cooperative with staff and requiring [facility] to help restrain pt to bed at this time for safety, no physical restraints required at this time. Dr. Roach and Dr. Yanez at bedside to assess. In preparation B52, 5 mg haldol 2 mg ativian and 50 mg benadryl puller per Dr. Roach. B52 given in r quad by Campbell VAZQUEZ   Pt continuing to thrash in bed while mpd present   Pt not willing to participate in nurses or Doctors assessment at this time and unnable to complete triage at this time

## 2025-03-06 NOTE — ED PROVIDER NOTES
[facility] EMERGENCY DEPARTMENT  Emergency Department Encounter  Emergency Medicine Resident     Pt Name:Brittney Norton  MRN: [id number]  Birthdate 1985  Date of evaluation: 3/6/25  PCP:  No primary care provider on file.  Note Started: 4:30 AM EST      CHIEF COMPLAINT       Chief Complaint   Patient presents with    Altered Mental Status    Drug Overdose       HISTORY OF PRESENT ILLNESS  (Location/Symptom, Timing/Onset, Context/Setting, Quality, Duration, Modifying Factors, Severity.)      Brittney Norton is a 39 y.o. female who presents with agitation and altered mental status after family called EMS.  Patient has been acting erratic.  She was found on her porch.  As per family and EMS report she has been smoking cocaine for the past 3 days and states that she is swallowed some cocaine.  Patient is clearly agitated yelling, erratic and not following commands.  She is not cooperative and not redirectable.  Denies having any chest pain or any shortness of breath.  Would not allow EMS to obtain vitals.  Patient is not answering any questions and insists on being let go.  Clearly has no decision-making capacity at this time.    PAST MEDICAL / SURGICAL / SOCIAL / FAMILY HISTORY      has a past medical history of Asthma.     has a past surgical history that includes Breast surgery; Cholecystectomy;  section; Dilation and curettage of uterus; and Umbilical hernia repair.    Social History     Socioeconomic History    Marital status: Single     Spouse name: Not on file    Number of children: Not on file    Years of education: Not on file    Highest education level: Not on file   Occupational History    Not on file   Tobacco Use    Smoking status: Every Day     Current packs/day: 1.00     Average packs/day: 1 pack/day for 15.0 years (15.0 ttl pk-yrs)     Types: Cigarettes    Smokeless tobacco: Never   Substance and Sexual Activity    Alcohol use: No    Drug use: No    Sexual activity: Never   Other  patient's condition requiring my direct management. Critical care time 0 minutes, excluding any documented procedures.    FINAL IMPRESSION      1. Altered mental status, unspecified altered mental status type    2. Cocaine abuse (HCC)          DISPOSITION / PLAN     DISPOSITION             PATIENT REFERRED TO:  No follow-up provider specified.    DISCHARGE MEDICATIONS:  New Prescriptions    No medications on file       Aide Naik MD  Emergency Medicine Resident    (Please note that portions of thisnote were completed with a voice recognition program.  Efforts were made to edit the dictations but occasionally words are mis-transcribed.)

## 2025-03-06 NOTE — ED PROVIDER NOTES
[facility]  FACULTY HANDOFF     7:15 AM EST  Handoff taken on the following patient from prior Attending Physician:  Pt Name: Brittney Norton  PCP:  No primary care provider on file.    Attestation  I was available and discussed any additional care issues that arose and coordinated the management plans with the resident(s) caring for the patient during my duty period. Any areas of disagreement with resident's documentation of care or procedures are noted on the chart. I was personally present for the key portions of any/all procedures during my duty period. I have documented in the chart those procedures where I was not present during the key portions.         CHIEF COMPLAINT       Chief Complaint   Patient presents with    Altered Mental Status    Drug Overdose         CURRENT MEDICATIONS     Previous Medications  Previous Medications    HYDROCORTISONE (ANUSOL-HC) 2.5 % CREA RECTAL CREAM    Apply to anal area, as needed, up to 6 times daily or after each bowel movement.    IBUPROFEN (ADVIL;MOTRIN) 800 MG TABLET    Take 1 tablet by mouth every 8 hours as needed for Pain    MISC. DEVICES (SITZ BATH) MISC    Utilize sitz bath for symptomatic treatment of hemorrhoid.       Encounter Medications  Orders Placed This Encounter   Medications    midazolam PF (VERSED) injection 2 mg    haloperidol lactate (HALDOL) injection 5 mg    LORazepam (ATIVAN) injection 2 mg    diphenhydrAMINE (BENADRYL) injection 50 mg    sodium chloride 0.9 % bolus 1,000 mL    midazolam (VERSED) 2 MG/2ML injection     Pako Truong: cabinet override    midazolam PF (VERSED) injection 1 mg    magnesium sulfate 2000 mg in 50 mL IVPB premix       ALLERGIES     has No Known Allergies.      RECENT VITALS:   Temp: 98.5 °F (36.9 °C),  Pulse: 95, Respirations: 24, BP: (!) 104/57    RADIOLOGY:   CT HEAD WO CONTRAST   Final Result   No acute intracranial abnormality.         CT CERVICAL SPINE WO CONTRAST   Final Result   1. No evidence of

## 2025-03-06 NOTE — ED NOTES
Patient declined writer's offer of [facility] tx resources. Patient confirmed depression & anxiety, denied SI/HI, a/v hallucinations. Patient agreeable to outpatient resources. Writer to provide [facility] & [facility] information.

## 2025-03-06 NOTE — ED NOTES
Social work present for overdose. Patient sedated. Patient to be evaluated when alert and oriented.

## 2025-03-06 NOTE — ED NOTES
Pt to ct, pt continuing to move around on ct table and unable to get through scan. 1 mg versed IV ordered by Dr. Roach and Given ivp by writer

## 2025-03-07 LAB
EKG ATRIAL RATE: 157 BPM
EKG Q-T INTERVAL: 308 MS
EKG QRS DURATION: 68 MS
EKG QTC CALCULATION (BAZETT): 497 MS
EKG R AXIS: 65 DEGREES
EKG T AXIS: 78 DEGREES
EKG VENTRICULAR RATE: 157 BPM

## 2025-03-07 PROCEDURE — 93010 ELECTROCARDIOGRAM REPORT: CPT | Performed by: INTERNAL MEDICINE

## 2025-03-10 LAB
EKG ATRIAL RATE: 138 BPM
EKG P AXIS: 49 DEGREES
EKG P-R INTERVAL: 176 MS
EKG Q-T INTERVAL: 386 MS
EKG QRS DURATION: 70 MS
EKG QTC CALCULATION (BAZETT): 515 MS
EKG R AXIS: 76 DEGREES
EKG T AXIS: 88 DEGREES
EKG VENTRICULAR RATE: 107 BPM

## 2025-03-10 PROCEDURE — 93010 ELECTROCARDIOGRAM REPORT: CPT | Performed by: INTERNAL MEDICINE
